# Patient Record
Sex: FEMALE | Race: WHITE | URBAN - METROPOLITAN AREA
[De-identification: names, ages, dates, MRNs, and addresses within clinical notes are randomized per-mention and may not be internally consistent; named-entity substitution may affect disease eponyms.]

---

## 2019-08-23 ENCOUNTER — OFFICE VISIT (OUTPATIENT)
Dept: FAMILY MEDICINE CLINIC | Age: 24
End: 2019-08-23

## 2019-08-23 VITALS
RESPIRATION RATE: 20 BRPM | HEIGHT: 65 IN | SYSTOLIC BLOOD PRESSURE: 120 MMHG | WEIGHT: 293 LBS | BODY MASS INDEX: 48.82 KG/M2 | OXYGEN SATURATION: 97 % | HEART RATE: 80 BPM | TEMPERATURE: 97.5 F | DIASTOLIC BLOOD PRESSURE: 80 MMHG

## 2019-08-23 DIAGNOSIS — E66.01 OBESITY, MORBID (HCC): ICD-10-CM

## 2019-08-23 DIAGNOSIS — F33.1 MODERATE RECURRENT MAJOR DEPRESSION (HCC): Primary | ICD-10-CM

## 2019-08-23 PROBLEM — J45.909 ASTHMA: Status: ACTIVE | Noted: 2017-02-28

## 2019-08-23 RX ORDER — ESCITALOPRAM OXALATE 10 MG/1
10 TABLET ORAL DAILY
Qty: 30 TAB | Refills: 0 | Status: SHIPPED | OUTPATIENT
Start: 2019-08-23

## 2019-08-23 NOTE — PROGRESS NOTES
Found office online. Off meds since 2016. Sxs worse since Oct. Boyfriend of a year committed suicide. Didn't get help as no insurance. Just got ins month ago. No psych here. Hosp 10 days back when went off meds. PHQ 9 score 14 today. No suicidal ideation. Patient denies chest pain, dyspnea, unexpected weight change, unexpected pain, mood or memory changes. Visit Vitals  /80 (BP 1 Location: Right arm, BP Patient Position: Sitting)   Pulse 80   Temp 97.5 °F (36.4 °C) (Oral)   Resp 20   Ht 5' 5\" (1.651 m)   Wt (!) 363 lb 3.2 oz (164.7 kg)   LMP  (LMP Unknown)   SpO2 97%   BMI 60.44 kg/m²     Patient alert and cooperative. Reviewed above. Assessment:  1. Recurrent major depressive disorder, triggered by traumatic life event. Plan:  1. Get back on Lexapro 10 mg, given a month. Return at that time for recheck. 2. Follow up through insurance for psychologist counseling. 3. Get form from [facility] for pet. 4. Follow otherwise here prn.